# Patient Record
Sex: MALE
[De-identification: names, ages, dates, MRNs, and addresses within clinical notes are randomized per-mention and may not be internally consistent; named-entity substitution may affect disease eponyms.]

---

## 2018-01-15 LAB
BASOPHILS # BLD AUTO: 0.1 10*3/UL (ref 0–0.1)
BASOPHILS NFR BLD AUTO: 0.6 % (ref 0–1)
DEPRECATED NEUTROPHILS # BLD AUTO: 5.4 10*3/UL (ref 2.1–6.9)
EOSINOPHIL # BLD AUTO: 0.1 10*3/UL (ref 0–0.4)
EOSINOPHIL NFR BLD AUTO: 1.1 % (ref 0–6)
ERYTHROCYTE [DISTWIDTH] IN CORD BLOOD: 14.3 % (ref 11.7–14.4)
HCT VFR BLD AUTO: 34.8 % (ref 38.2–49.6)
HGB BLD-MCNC: 12.5 G/DL (ref 14–18)
LYMPHOCYTES # BLD: 1.8 10*3/UL (ref 1–3.2)
LYMPHOCYTES NFR BLD AUTO: 22.5 % (ref 18–39.1)
MCH RBC QN AUTO: 33 PG (ref 28–32)
MCHC RBC AUTO-ENTMCNC: 35.9 G/DL (ref 31–35)
MCV RBC AUTO: 91.8 FL (ref 81–99)
MONOCYTES # BLD AUTO: 0.5 10*3/UL (ref 0.2–0.8)
MONOCYTES NFR BLD AUTO: 6.4 % (ref 4.4–11.3)
NEUTS SEG NFR BLD AUTO: 69.1 % (ref 38.7–80)
PLATELET # BLD AUTO: 240 X10E3/UL (ref 140–360)
RBC # BLD AUTO: 3.79 X10E6/UL (ref 4.3–5.7)

## 2018-01-18 ENCOUNTER — HOSPITAL ENCOUNTER (OUTPATIENT)
Dept: HOSPITAL 88 - OR | Age: 68
Discharge: HOME | End: 2018-01-18
Attending: INTERNAL MEDICINE
Payer: MEDICARE

## 2018-01-18 DIAGNOSIS — D12.8: ICD-10-CM

## 2018-01-18 DIAGNOSIS — R19.5: Primary | ICD-10-CM

## 2018-01-18 DIAGNOSIS — K64.8: ICD-10-CM

## 2018-01-18 DIAGNOSIS — I10: ICD-10-CM

## 2018-01-18 DIAGNOSIS — Z01.812: ICD-10-CM

## 2018-01-18 DIAGNOSIS — K21.9: ICD-10-CM

## 2018-01-18 DIAGNOSIS — Z01.810: ICD-10-CM

## 2018-01-18 DIAGNOSIS — K57.30: ICD-10-CM

## 2018-01-18 DIAGNOSIS — D12.3: ICD-10-CM

## 2018-01-18 DIAGNOSIS — F17.210: ICD-10-CM

## 2018-01-18 PROCEDURE — 45380 COLONOSCOPY AND BIOPSY: CPT

## 2018-01-18 PROCEDURE — 88305 TISSUE EXAM BY PATHOLOGIST: CPT

## 2018-01-18 PROCEDURE — 45385 COLONOSCOPY W/LESION REMOVAL: CPT

## 2018-01-18 PROCEDURE — 93005 ELECTROCARDIOGRAM TRACING: CPT

## 2018-01-18 PROCEDURE — 36415 COLL VENOUS BLD VENIPUNCTURE: CPT

## 2018-01-18 PROCEDURE — 85025 COMPLETE CBC W/AUTO DIFF WBC: CPT

## 2018-07-02 ENCOUNTER — HOSPITAL ENCOUNTER (OUTPATIENT)
Dept: HOSPITAL 88 - CT | Age: 68
End: 2018-07-02
Attending: FAMILY MEDICINE
Payer: MEDICARE

## 2018-07-02 DIAGNOSIS — R91.8: Primary | ICD-10-CM

## 2018-07-02 LAB
DEPRECATED APTT PLAS QN: 31.6 SECONDS (ref 23.8–35.5)
DEPRECATED INR PLAS: 1.03
PROTHROMBIN TIME: 12.7 SECONDS (ref 11.9–14.5)

## 2018-07-02 PROCEDURE — 85610 PROTHROMBIN TIME: CPT

## 2018-07-02 PROCEDURE — 85049 AUTOMATED PLATELET COUNT: CPT

## 2018-07-02 PROCEDURE — 36415 COLL VENOUS BLD VENIPUNCTURE: CPT

## 2018-07-02 PROCEDURE — 85730 THROMBOPLASTIN TIME PARTIAL: CPT

## 2018-07-02 PROCEDURE — 77012 CT SCAN FOR NEEDLE BIOPSY: CPT

## 2018-07-02 PROCEDURE — 32405: CPT

## 2018-07-02 PROCEDURE — 88305 TISSUE EXAM BY PATHOLOGIST: CPT

## 2018-07-02 PROCEDURE — 71045 X-RAY EXAM CHEST 1 VIEW: CPT

## 2018-07-02 PROCEDURE — 88333 PATH CONSLTJ SURG CYTO XM 1: CPT

## 2018-07-02 NOTE — DIAGNOSTIC IMAGING REPORT
PROCEDURE:CT GUIDED NEEDLE PLACEMENT

COMPARISON:CT chest outside facility 12/15/2017.

Preprocedure diagnosis: Right upper lobe mass

Post procedure diagnosis: Right upper lobe mass

: Ulysses Coleman M.D.

Sedation/anesthesia: Versed 0.5 mg intravenous, fentanyl 25 mcg 

intravenous. The patient's heart rate and pulse oximetry were 

continuously monitored by the interventional radiology nurse. Blood 

pressure was monitored at 5 minute intervals.

Additional medications: Lidocaine 1% for local anesthesia

Estimated blood loss: Less than 10 cc

Blood products administered: None

Specimens: Core biopsy specimens x10

Implants/grafts: [employer] pleural plug device

Complications: Small iatrogenic pneumothorax

 

Condition at completion of procedure: Stable

Disposition: Radiology holding area

 

Procedure in detail: Informed consent for the procedure was obtained 

from the patient and documented in the medical record after discussion 

of risks and benefits.

 

The patient was placed in the prone position on the CT couch. A marker 

grid was placed over the right upper back. Limited CT examination 

showed a suitable percutaneous approach to the right upper lobe mass 

lesion. The overlying skin was prepped and draped in the standard 

sterile fashion. 1% lidocaine was infiltrated into the skin and 

subcutaneous tissues for local anesthesia. Then under intermittent CT 

guidance, a 16 gauge needle guide was advanced to the periphery of the 

mass lesion. Subsequently, a total of 10 core biopsy specimens were 

obtained using an 18 gauge, 2 cm throw core biopsy apparatus. Specimens 

were submitted to on-site pathology personnel. At the conclusion of 

sampling a pleural plug device was deployed through the needle guide as 

it was removed.

 

Post procedure CT scan of the chest showed a small iatrogenic right 

pneumothorax with maximum air gap of 15 mm. The patient remained 

hemodynamically stable and asymptomatic with adequate oxygen saturation 

throughout the immediate post procedure period.

 

 

 

CONCLUSION:

Successful CT guided core biopsies of a right upper lobe mass lesion.

 

Small iatrogenic pneumothorax will be followed by serial chest 

radiographs. 

 

Dictated by:  Ulysses Coleman M.D. on 7/02/2018 at 12:00     

Electronically approved by:  Ulysses Coleman M.D. on 7/02/2018 at 12:00

## 2018-07-02 NOTE — DIAGNOSTIC IMAGING REPORT
PROCEDURE: CHEST XRAY POST PROCEDURE

COMPARISON: 11/30/2017.

INDICATIONS: STATUS POST LUNG BIOPSY

 

FINDINGS:



Small right apical pneumothorax (maximum air gap 15 mm) related to 

recent CT-guided core biopsy of known a right upper lobe mass. Linear 

scar in the left mid lung unchanged. Stable cardiomediastinal contour 

with tortuosity and atherosclerotic calcification of the thoracic 

aorta.

 

No acute osseous abnormality.

 

CONCLUSION:

Small iatrogenic right apical pneumothorax, maximum air gap 15 mm. 

 

Dictated by:  Ulysses Coleman M.D. on 7/02/2018 at 11:19     

Electronically approved by:  Ulysses Coleman M.D. on 7/02/2018 at 11:19

## 2018-07-02 NOTE — DIAGNOSTIC IMAGING REPORT
PROCEDURE:X-RAY CHEST, ONE VIEW

 

COMPARISON:7/2/2018 1107.

 

INDICATIONS:STATUS POST LUNG BIOPSY

 

FINDINGS:

 

 

See conclusion

 

 

CONCLUSION:

Stable small right apical pneumothorax status post lung biopsy, with 

air gap 15 mm.

 

Right upper lobe mass and left upper lobe linear scar are unchanged. 

Dictated by:  Ulysses Coleman M.D. on 7/02/2018 at 13:26     

Electronically approved by:  Ulysses Coleman M.D. on 7/02/2018 at 13:26